# Patient Record
Sex: MALE | Race: BLACK OR AFRICAN AMERICAN | NOT HISPANIC OR LATINO | URBAN - METROPOLITAN AREA
[De-identification: names, ages, dates, MRNs, and addresses within clinical notes are randomized per-mention and may not be internally consistent; named-entity substitution may affect disease eponyms.]

---

## 2019-05-26 ENCOUNTER — COMMUNICATION - HEALTHEAST (OUTPATIENT)
Dept: SCHEDULING | Facility: CLINIC | Age: 29
End: 2019-05-26

## 2021-05-29 NOTE — TELEPHONE ENCOUNTER
"ERROR    Unable to verify caller with this chart        Caller did provide the correct  for this pt and caller ID name from phone system matched but not the tele#     Caller did not wish to provide more details to validate his ID     I did direct him to seek medical attention today for the issue he had called about - he was amenable to that indicated that he was \"coming in\"      Tyler Langford RN   Triage and Medication Refills    "